# Patient Record
Sex: FEMALE | Race: WHITE
[De-identification: names, ages, dates, MRNs, and addresses within clinical notes are randomized per-mention and may not be internally consistent; named-entity substitution may affect disease eponyms.]

---

## 2021-07-24 ENCOUNTER — HOSPITAL ENCOUNTER (EMERGENCY)
Dept: HOSPITAL 11 - JP.ED | Age: 21
Discharge: HOME | End: 2021-07-24
Payer: SELF-PAY

## 2021-07-24 DIAGNOSIS — R00.0: ICD-10-CM

## 2021-07-24 DIAGNOSIS — M62.838: ICD-10-CM

## 2021-07-24 DIAGNOSIS — R25.3: ICD-10-CM

## 2021-07-24 DIAGNOSIS — T40.7X5A: ICD-10-CM

## 2021-07-24 DIAGNOSIS — F41.9: Primary | ICD-10-CM

## 2021-07-24 PROCEDURE — 99284 EMERGENCY DEPT VISIT MOD MDM: CPT

## 2021-07-24 NOTE — EDM.PDOC
ED HPI GENERAL MEDICAL PROBLEM





- General


Chief Complaint: General


Stated Complaint: MEDICAL VIA NORTH


Time Seen by Provider: 07/24/21 22:28


Source of Information: Reports: Patient


History Limitations: Reports: No Limitations





- History of Present Illness


INITIAL COMMENTS - FREE TEXT/NARRATIVE: 





22 yo female presents to ER via EMS following first time trying and eatable THC.

 She became very anxious, muscle spasms and skin felt funny.  She was 

tachycardic in the ambulance but sinus.  She is feeling much better currently.  

she continues to feel muscle twitching but her anxiety is settling.  She is 

present today with a friend 





- Related Data


                                    Allergies











Allergy/AdvReac Type Severity Reaction Status Date / Time


 


No Known Allergies Allergy   Verified 07/24/21 22:16











Home Meds: 


                                    Home Meds





Amphetamine/Dextroamphetamine [Adderall] 15 mg PO DAILY 07/24/21 [History]











Social & Family History





- Tobacco Use


Tobacco Use Status *Q: Never Tobacco User





- Recreational Drug Use


Recreational Drug Use: Yes


Recreational Drug Type: Reports: Marijuana/Hashish





ED ROS GENERAL





- Review of Systems


Review Of Systems: See Below


Constitutional: Denies: Fever, Chills


Respiratory: Denies: Shortness of Breath, Wheezing


Cardiovascular: Denies: Chest Pain





ED EXAM, GENERAL





- Physical Exam


Exam: See Below


Exam Limited By: No Limitations


General Appearance: Alert, WD/WN, No Apparent Distress


Neck: Normal Inspection, Supple, Non-Tender, Full Range of Motion.  No: 

Lymphadenopathy (R), Lymphadenopathy (L)


Respiratory/Chest: No Respiratory Distress, Lungs Clear, Normal Breath Sounds, 

No Accessory Muscle Use, Chest Non-Tender


Cardiovascular: Tachycardia


GI/Abdominal: Normal Bowel Sounds, Soft, Non-Tender


Neurological: Alert, Oriented


Psychiatric: Anxious


Skin Exam: Warm, Dry, Intact





Course





- Vital Signs


Last Recorded V/S: 





                                Last Vital Signs











Temp  36.4 C   07/24/21 22:14


 


Pulse  127 H  07/24/21 22:14


 


Resp  20   07/24/21 22:14


 


BP  126/69   07/24/21 22:14


 


Pulse Ox  98   07/24/21 22:14














- Orders/Labs/Meds


Orders: 





                               Active Orders 24 hr











 Category Date Time Status


 


 hydrOXYzine HCL [Atarax] Med  07/24/21 22:47 Once





 25 mg PO ONETIME ONE   














- Re-Assessments/Exams


Free Text/Narrative Re-Assessment/Exam: 





07/24/21 22:53


po hydroxyzine given for muscle twitching and anxiety.  Encouraged oral fluid 

intake.  discharged home with friend 





Departure





- Departure


Time of Disposition: 22:54


Disposition: Home, Self-Care 01


Condition: Good


Clinical Impression: 


Adverse reaction to cannabis


Qualifiers:


 Encounter type: initial encounter Qualified Code(s): T40.7X5A - Adverse effect 

of cannabis (derivatives), initial encounter








- Discharge Information


*PRESCRIPTION DRUG MONITORING PROGRAM REVIEWED*: Not Applicable


*COPY OF PRESCRIPTION DRUG MONITORING REPORT IN PATIENT ABRAN: Not Applicable


Referrals: 


PCP,None [Primary Care Provider] - 


Additional Instructions: 


increase fluid intake with goal of 1-1.5 liters per day over the next few days


avoid cannabis use in the future


rest








Sepsis Event Note (ED)





- Evaluation


Sepsis Screening Result: No Definite Risk





- Focused Exam


Vital Signs: 





                                   Vital Signs











  Temp Pulse Resp BP Pulse Ox


 


 07/24/21 22:14  36.4 C  127 H  20  126/69  98














- My Orders


Last 24 Hours: 





My Active Orders





07/24/21 22:47


hydrOXYzine HCL [Atarax]   25 mg PO ONETIME ONE 














- Assessment/Plan


Last 24 Hours: 





My Active Orders





07/24/21 22:47


hydrOXYzine HCL [Atarax]   25 mg PO ONETIME ONE